# Patient Record
Sex: MALE | Race: WHITE | NOT HISPANIC OR LATINO | Employment: FULL TIME | URBAN - METROPOLITAN AREA
[De-identification: names, ages, dates, MRNs, and addresses within clinical notes are randomized per-mention and may not be internally consistent; named-entity substitution may affect disease eponyms.]

---

## 2023-05-05 ENCOUNTER — APPOINTMENT (OUTPATIENT)
Dept: PRIMARY CARE | Facility: CLINIC | Age: 53
End: 2023-05-05
Payer: COMMERCIAL

## 2023-06-13 ENCOUNTER — OFFICE VISIT (OUTPATIENT)
Dept: PRIMARY CARE | Facility: CLINIC | Age: 53
End: 2023-06-13
Payer: COMMERCIAL

## 2023-06-13 VITALS
HEIGHT: 75 IN | TEMPERATURE: 98.4 F | WEIGHT: 304 LBS | DIASTOLIC BLOOD PRESSURE: 90 MMHG | SYSTOLIC BLOOD PRESSURE: 142 MMHG | BODY MASS INDEX: 37.8 KG/M2

## 2023-06-13 DIAGNOSIS — Z00.00 GENERAL MEDICAL EXAM: Primary | ICD-10-CM

## 2023-06-13 DIAGNOSIS — Z12.11 ENCOUNTER FOR SCREENING FOR MALIGNANT NEOPLASM OF COLON: ICD-10-CM

## 2023-06-13 LAB
ALANINE AMINOTRANSFERASE (SGPT) (U/L) IN SER/PLAS: 11 U/L (ref 10–52)
ALBUMIN (G/DL) IN SER/PLAS: 4.8 G/DL (ref 3.4–5)
ALKALINE PHOSPHATASE (U/L) IN SER/PLAS: 64 U/L (ref 33–120)
AMORPHOUS CRYSTALS, URINE: ABNORMAL /HPF
ANION GAP IN SER/PLAS: 16 MMOL/L (ref 10–20)
APPEARANCE, URINE: ABNORMAL
ASPARTATE AMINOTRANSFERASE (SGOT) (U/L) IN SER/PLAS: 19 U/L (ref 9–39)
BACTERIA, URINE: ABNORMAL /HPF
BASOPHILS (10*3/UL) IN BLOOD BY AUTOMATED COUNT: 0.04 X10E9/L (ref 0–0.1)
BASOPHILS/100 LEUKOCYTES IN BLOOD BY AUTOMATED COUNT: 0.7 % (ref 0–2)
BILIRUBIN TOTAL (MG/DL) IN SER/PLAS: 0.7 MG/DL (ref 0–1.2)
BILIRUBIN, URINE: NEGATIVE
BLOOD, URINE: ABNORMAL
CALCIUM (MG/DL) IN SER/PLAS: 9.6 MG/DL (ref 8.6–10.3)
CARBON DIOXIDE, TOTAL (MMOL/L) IN SER/PLAS: 28 MMOL/L (ref 21–32)
CHLORIDE (MMOL/L) IN SER/PLAS: 103 MMOL/L (ref 98–107)
CHOLESTEROL (MG/DL) IN SER/PLAS: 306 MG/DL (ref 0–199)
CHOLESTEROL IN HDL (MG/DL) IN SER/PLAS: 39.9 MG/DL
CHOLESTEROL/HDL RATIO: 7.7
COLOR, URINE: YELLOW
CREATININE (MG/DL) IN SER/PLAS: 1.09 MG/DL (ref 0.5–1.3)
EOSINOPHILS (10*3/UL) IN BLOOD BY AUTOMATED COUNT: 0.24 X10E9/L (ref 0–0.7)
EOSINOPHILS/100 LEUKOCYTES IN BLOOD BY AUTOMATED COUNT: 4.1 % (ref 0–6)
ERYTHROCYTE DISTRIBUTION WIDTH (RATIO) BY AUTOMATED COUNT: 13 % (ref 11.5–14.5)
ERYTHROCYTE MEAN CORPUSCULAR HEMOGLOBIN CONCENTRATION (G/DL) BY AUTOMATED: 33 G/DL (ref 32–36)
ERYTHROCYTE MEAN CORPUSCULAR VOLUME (FL) BY AUTOMATED COUNT: 93 FL (ref 80–100)
ERYTHROCYTES (10*6/UL) IN BLOOD BY AUTOMATED COUNT: 4.95 X10E12/L (ref 4.5–5.9)
ESTIMATED AVERAGE GLUCOSE FOR HBA1C: 120 MG/DL
GFR MALE: 82 ML/MIN/1.73M2
GLUCOSE (MG/DL) IN SER/PLAS: 101 MG/DL (ref 74–99)
GLUCOSE, URINE: NEGATIVE MG/DL
HEMATOCRIT (%) IN BLOOD BY AUTOMATED COUNT: 45.8 % (ref 41–52)
HEMOGLOBIN (G/DL) IN BLOOD: 15.1 G/DL (ref 13.5–17.5)
HEMOGLOBIN A1C/HEMOGLOBIN TOTAL IN BLOOD: 5.8 %
HYALINE CASTS, URINE: ABNORMAL /LPF
IMMATURE GRANULOCYTES/100 LEUKOCYTES IN BLOOD BY AUTOMATED COUNT: 0.3 % (ref 0–0.9)
KETONES, URINE: ABNORMAL MG/DL
LDL: 236 MG/DL (ref 0–99)
LEUKOCYTE ESTERASE, URINE: NEGATIVE
LEUKOCYTES (10*3/UL) IN BLOOD BY AUTOMATED COUNT: 5.9 X10E9/L (ref 4.4–11.3)
LYMPHOCYTES (10*3/UL) IN BLOOD BY AUTOMATED COUNT: 2.23 X10E9/L (ref 1.2–4.8)
LYMPHOCYTES/100 LEUKOCYTES IN BLOOD BY AUTOMATED COUNT: 37.9 % (ref 13–44)
MONOCYTES (10*3/UL) IN BLOOD BY AUTOMATED COUNT: 0.44 X10E9/L (ref 0.1–1)
MONOCYTES/100 LEUKOCYTES IN BLOOD BY AUTOMATED COUNT: 7.5 % (ref 2–10)
MUCUS, URINE: ABNORMAL /LPF
NEUTROPHILS (10*3/UL) IN BLOOD BY AUTOMATED COUNT: 2.92 X10E9/L (ref 1.2–7.7)
NEUTROPHILS/100 LEUKOCYTES IN BLOOD BY AUTOMATED COUNT: 49.5 % (ref 40–80)
NITRITE, URINE: NEGATIVE
PH, URINE: 5 (ref 5–8)
PLATELETS (10*3/UL) IN BLOOD AUTOMATED COUNT: 269 X10E9/L (ref 150–450)
POTASSIUM (MMOL/L) IN SER/PLAS: 4.5 MMOL/L (ref 3.5–5.3)
PROSTATE SPECIFIC AG (NG/ML) IN SER/PLAS: 0.41 NG/ML (ref 0–4)
PROTEIN TOTAL: 7.5 G/DL (ref 6.4–8.2)
PROTEIN, URINE: NEGATIVE MG/DL
RBC, URINE: 4 /HPF (ref 0–5)
SODIUM (MMOL/L) IN SER/PLAS: 142 MMOL/L (ref 136–145)
SPECIFIC GRAVITY, URINE: 1.03 (ref 1–1.03)
THYROTROPIN (MIU/L) IN SER/PLAS BY DETECTION LIMIT <= 0.05 MIU/L: 2.93 MIU/L (ref 0.44–3.98)
TRIGLYCERIDE (MG/DL) IN SER/PLAS: 153 MG/DL (ref 0–149)
UREA NITROGEN (MG/DL) IN SER/PLAS: 23 MG/DL (ref 6–23)
UROBILINOGEN, URINE: <2 MG/DL (ref 0–1.9)
VLDL: 31 MG/DL (ref 0–40)
WBC, URINE: ABNORMAL /HPF (ref 0–5)

## 2023-06-13 PROCEDURE — 83036 HEMOGLOBIN GLYCOSYLATED A1C: CPT

## 2023-06-13 PROCEDURE — 99396 PREV VISIT EST AGE 40-64: CPT | Performed by: FAMILY MEDICINE

## 2023-06-13 PROCEDURE — 85025 COMPLETE CBC W/AUTO DIFF WBC: CPT

## 2023-06-13 PROCEDURE — 80061 LIPID PANEL: CPT

## 2023-06-13 PROCEDURE — 80053 COMPREHEN METABOLIC PANEL: CPT

## 2023-06-13 PROCEDURE — 81001 URINALYSIS AUTO W/SCOPE: CPT

## 2023-06-13 PROCEDURE — 84153 ASSAY OF PSA TOTAL: CPT

## 2023-06-13 PROCEDURE — 84443 ASSAY THYROID STIM HORMONE: CPT

## 2023-06-13 PROCEDURE — 1036F TOBACCO NON-USER: CPT | Performed by: FAMILY MEDICINE

## 2023-06-13 ASSESSMENT — ENCOUNTER SYMPTOMS
NUMBNESS: 0
HEMATURIA: 0
NAUSEA: 0
DYSURIA: 0
SHORTNESS OF BREATH: 0
FATIGUE: 0
DEPRESSION: 0
ADENOPATHY: 0
CONSTIPATION: 0
WHEEZING: 0
WOUND: 0
SINUS PAIN: 0
COUGH: 0
ABDOMINAL PAIN: 0
VOICE CHANGE: 0
FREQUENCY: 1
MYALGIAS: 0
RHINORRHEA: 0
DIARRHEA: 0
HEADACHES: 0
FEVER: 0
JOINT SWELLING: 0
WEAKNESS: 0
VOMITING: 0
DIZZINESS: 0
SORE THROAT: 0
NERVOUS/ANXIOUS: 0
BLOOD IN STOOL: 0
PALPITATIONS: 0
DYSPHORIC MOOD: 0
SLEEP DISTURBANCE: 0
TROUBLE SWALLOWING: 0
BACK PAIN: 0

## 2023-06-13 ASSESSMENT — PATIENT HEALTH QUESTIONNAIRE - PHQ9
SUM OF ALL RESPONSES TO PHQ9 QUESTIONS 1 AND 2: 0
1. LITTLE INTEREST OR PLEASURE IN DOING THINGS: NOT AT ALL
2. FEELING DOWN, DEPRESSED OR HOPELESS: NOT AT ALL

## 2023-06-13 NOTE — PATIENT INSTRUCTIONS
I recommend continued weight loss with a low carb diet.  Try to eliminate pop and drink only water.  Try to exercise on a regular basis.  I will order a colonoscopy.  Labs will be ordered today.  Your blood pressure is high.  Try to lower sodium in the diet.  Return in three months for a recheck on the blood pressure and weight loss. For now, we will avoid medication.

## 2023-06-13 NOTE — PROGRESS NOTES
"Subjective   Patient ID: [id number]     Angel Beckwith is a 53 y.o. male who presents for Annual Exam (Fasting).  HPI  He is here for a general medical exam.      He quit smokikng 8 to 10 years ago.  He had smoked for twenty years at a half PPD on average.    Occasional alcohol. No drugs.    FamHx.  Mom and Dad had heart problems.  Dad had CABG at 47.  Mom CAD 60s.    Thyroid cancer mom.  Dad had skin cancer, uncertain type.  Brother has HTN.    He sees a dentist regularly.    Tries to maintain a healthy diet.     Exercises in streaks.    States his BP has been 135/75.  Today's reading in the highest he has had in three months.        Review of Systems   Constitutional:  Negative for fatigue and fever.   HENT:  Negative for congestion, rhinorrhea, sinus pain, sore throat, trouble swallowing and voice change.    Respiratory:  Negative for cough, shortness of breath and wheezing.    Cardiovascular:  Negative for chest pain, palpitations and leg swelling.   Gastrointestinal:  Negative for abdominal pain, blood in stool, constipation, diarrhea, nausea and vomiting.   Genitourinary:  Positive for frequency (occasional nocturia). Negative for dysuria and hematuria.   Musculoskeletal:  Negative for back pain, joint swelling and myalgias.   Skin:  Negative for rash and wound.        No skin moles that are growing or changing.   Neurological:  Negative for dizziness, syncope, weakness, numbness and headaches.   Hematological:  Negative for adenopathy.   Psychiatric/Behavioral:  Negative for dysphoric mood, self-injury, sleep disturbance and suicidal ideas. The patient is not nervous/anxious.    No lumps or swelling in the neck.    He has never had a colonoscopy.      He has been eating straight meat and cheese.  He has lost 16 pounds in three and a half week.  He drinks three or four pops per day.      Objective     /90 (BP Location: Right arm, Patient Position: Sitting)   Temp 36.9 °C (98.4 °F)   Ht 1.905 m (6' 3\")   " Wt 138 kg (304 lb)   BMI 38.00 kg/m²      Physical Exam  Vitals reviewed.   Constitutional:       General: He is not in acute distress.     Appearance: Normal appearance. He is not ill-appearing or toxic-appearing.   HENT:      Head: Normocephalic and atraumatic.      Right Ear: Tympanic membrane, ear canal and external ear normal.      Left Ear: Tympanic membrane, ear canal and external ear normal.      Nose: Nose normal.      Mouth/Throat:      Mouth: Mucous membranes are moist.   Eyes:      Extraocular Movements: Extraocular movements intact.      Conjunctiva/sclera: Conjunctivae normal.      Pupils: Pupils are equal, round, and reactive to light.   Cardiovascular:      Rate and Rhythm: Normal rate and regular rhythm.      Heart sounds: No murmur heard.  Pulmonary:      Effort: Pulmonary effort is normal.      Breath sounds: Normal breath sounds.   Abdominal:      General: Bowel sounds are normal. There is no distension.      Palpations: Abdomen is soft. There is no mass.      Tenderness: There is no abdominal tenderness. There is no guarding or rebound.   Genitourinary:     Prostate: Normal.   Musculoskeletal:         General: No tenderness.      Cervical back: Neck supple.      Right lower leg: No edema.      Left lower leg: No edema.   Skin:     Coloration: Skin is not jaundiced or pale.      Findings: No rash.   Neurological:      General: No focal deficit present.      Mental Status: He is alert and oriented to person, place, and time. Mental status is at baseline.   Psychiatric:         Mood and Affect: Mood normal.         Behavior: Behavior normal.         Thought Content: Thought content normal.         Judgment: Judgment normal.         Assessment/Plan   Problem List Items Addressed This Visit    None  Visit Diagnoses       General medical exam    -  Primary    Relevant Orders    Urinalysis with Reflex Microscopic    Thyroid Stimulating Hormone    Hemoglobin A1C    Lipid Panel    CBC and Auto  Differential    Comprehensive Metabolic Panel    Prostate Specific Antigen    Encounter for screening for malignant neoplasm of colon        Relevant Orders    Colonoscopy        I recommend continued weight loss with a low carb diet.  Try to eliminate pop and drink only water.  Try to exercise on a regular basis.  I will order a colonoscopy.  Labs will be ordered today.  Your blood pressure is high.  Try to lower sodium in the diet.  Return in three months for a recheck on the blood pressure and weight loss. For now, we will avoid medication.      Shingrix shot was declined today.    Stephen Johnson, DO

## 2023-06-14 DIAGNOSIS — E78.5 HYPERLIPIDEMIA, UNSPECIFIED HYPERLIPIDEMIA TYPE: Primary | ICD-10-CM

## 2023-06-15 DIAGNOSIS — E78.5 HYPERLIPIDEMIA, UNSPECIFIED HYPERLIPIDEMIA TYPE: Primary | ICD-10-CM

## 2023-06-15 RX ORDER — ATORVASTATIN CALCIUM 40 MG/1
40 TABLET, FILM COATED ORAL DAILY
Qty: 30 TABLET | Refills: 3 | Status: SHIPPED | OUTPATIENT
Start: 2023-06-15

## 2023-06-15 RX ORDER — ATORVASTATIN CALCIUM 40 MG/1
40 TABLET, FILM COATED ORAL DAILY
COMMUNITY
End: 2023-06-15 | Stop reason: SDUPTHER

## 2023-06-15 NOTE — TELEPHONE ENCOUNTER
----- Message from Stephen Johnson DO sent at 6/14/2023 10:21 PM EDT -----  Please let him know his labs showed a high glucose in the pre-diabetic range and a very high cholesterol, over 300.  I recommend weight loss and avoiding sugars and starches in the diet.  I recommend starting a pill to help lower his cholesterol  Please load up atorvastatin 40 mg #30 one po every day with three refills and find out which pharmacy he would like it sent to.  He should return fasting in three months for a recheck.

## 2023-11-20 ENCOUNTER — TELEPHONE (OUTPATIENT)
Dept: PRIMARY CARE | Facility: CLINIC | Age: 53
End: 2023-11-20
Payer: COMMERCIAL

## 2023-11-20 NOTE — TELEPHONE ENCOUNTER
Dorita from [facility] [employer] is calling she would like to know why on 06/13/23 when pt was in for his CPE you ordered a CMP, CBC, thyroid, PSA, UA, and urinalysis? She states the only diag is general medical exam. Is there other diag? If so this would need to be resent to the lab so they can rebill the Ins. You can reach Adela at [phone].